# Patient Record
Sex: FEMALE | Race: WHITE | NOT HISPANIC OR LATINO | URBAN - METROPOLITAN AREA
[De-identification: names, ages, dates, MRNs, and addresses within clinical notes are randomized per-mention and may not be internally consistent; named-entity substitution may affect disease eponyms.]

---

## 2020-07-14 ENCOUNTER — OFFICE VISIT (OUTPATIENT)
Dept: URGENT CARE | Facility: CLINIC | Age: 45
End: 2020-07-14
Payer: COMMERCIAL

## 2020-07-14 VITALS
DIASTOLIC BLOOD PRESSURE: 98 MMHG | TEMPERATURE: 98 F | OXYGEN SATURATION: 97 % | HEART RATE: 73 BPM | SYSTOLIC BLOOD PRESSURE: 128 MMHG | RESPIRATION RATE: 18 BRPM

## 2020-07-14 DIAGNOSIS — I10 ESSENTIAL HYPERTENSION: Primary | ICD-10-CM

## 2020-07-14 DIAGNOSIS — Z91.09 ENVIRONMENTAL ALLERGIES: ICD-10-CM

## 2020-07-14 PROCEDURE — 99203 OFFICE O/P NEW LOW 30 MIN: CPT | Performed by: FAMILY MEDICINE

## 2020-07-14 RX ORDER — ALBUTEROL SULFATE 90 UG/1
2 INHALANT RESPIRATORY (INHALATION) EVERY 6 HOURS PRN
Qty: 1 INHALER | Refills: 0 | Status: SHIPPED | OUTPATIENT
Start: 2020-07-14

## 2020-07-14 RX ORDER — LISINOPRIL 40 MG/1
40 TABLET ORAL
COMMUNITY
Start: 2020-07-10 | End: 2020-07-14 | Stop reason: SDUPTHER

## 2020-07-14 RX ORDER — IBUPROFEN 200 MG
1 TABLET ORAL
COMMUNITY
Start: 2020-01-02

## 2020-07-14 RX ORDER — ALBUTEROL SULFATE 90 UG/1
2 INHALANT RESPIRATORY (INHALATION) EVERY 6 HOURS PRN
COMMUNITY
End: 2020-07-14 | Stop reason: SDUPTHER

## 2020-07-14 RX ORDER — METFORMIN HYDROCHLORIDE 1000 MG/1
1000 TABLET ORAL
COMMUNITY
Start: 2020-06-11

## 2020-07-14 RX ORDER — AMLODIPINE BESYLATE 10 MG/1
TABLET ORAL
COMMUNITY
Start: 2020-06-11

## 2020-07-14 RX ORDER — LISINOPRIL 40 MG/1
40 TABLET ORAL DAILY
Qty: 30 TABLET | Refills: 0 | Status: SHIPPED | OUTPATIENT
Start: 2020-07-14 | End: 2020-08-13

## 2020-07-14 RX ORDER — TRIAMTERENE AND HYDROCHLOROTHIAZIDE 37.5; 25 MG/1; MG/1
1 CAPSULE ORAL
COMMUNITY
Start: 2020-03-24

## 2020-07-14 RX ORDER — PIOGLITAZONEHYDROCHLORIDE 30 MG/1
TABLET ORAL
COMMUNITY
Start: 2020-06-11

## 2020-07-14 RX ORDER — METOPROLOL SUCCINATE 100 MG/1
TABLET, EXTENDED RELEASE ORAL
COMMUNITY
Start: 2020-06-11

## 2020-07-14 ASSESSMENT — ENCOUNTER SYMPTOMS
FEVER: 0
SHORTNESS OF BREATH: 0
CHILLS: 0
COUGH: 1

## 2020-07-14 ASSESSMENT — PAIN SCALES - GENERAL: PAINLEVEL: 0-NO PAIN

## 2020-07-14 NOTE — PROGRESS NOTES
Chief Complaint: left albuterol inhaler at home. Needs refill of lisinopril.    Subjective:    Colleen Yung is a 44 y.o. female with:     1) HTN: Patient reports she has been compliant with lisinopril medication for treatment of her hypertension.  She has been on this medication for approximately 1 year with no negative side effects.  She is ranging from [place], but is currently in the [facility] until mid August.  Patient needs a refill for lisinopril she will be out of medications prior to her trip home.    2) seasonal allergies: Patient reports that she typically uses albuterol to help with nighttime coughing due to seasonal allergies.  It typically happens when patient's camping which she is currently doing at this time.  She is currently using other conservative therapies such as Allegra to help with her allergy symptoms.  However, albuterol is what she uses and helps her the most at night.  She does notice some wheezing but denies any issues with shortness of breath.    The following have been reviewed and updated as needed:  Allergies  Meds  Problems  Med Hx  Surg Hx  Fam Hx         Meds:    Current Outpatient Medications:   •  metoprolol succinate XL (TOPROL-XL) 100 mg 24 hr tablet, TAKE 1 TABLET BY MOUTH EVERY DAY, Disp: , Rfl:   •  metFORMIN (GLUCOPHAGE) 1,000 mg tablet, Take 1,000 mg by mouth, Disp: , Rfl:   •  amLODIPine (NORVASC) 10 mg tablet, TAKE 1 TABLET BY MOUTH EVERY DAY, Disp: , Rfl:   •  triamterene-hydrochlorothiazide (DYAZIDE) 37.5-25 mg per capsule, Take 1 capsule by mouth, Disp: , Rfl:   •  pioglitazone (ACTOS) 30 mg tablet, TAKE 1 TABLET BY MOUTH DAILY IN THE MORNING, Disp: , Rfl:   •  nicotine (NICODERM CQ) 21 mg/24 hr, Place 1 patch on the skin, Disp: , Rfl:   •  lisinopriL (PRINIVIL,ZESTRIL) 40 mg tablet, Take 1 tablet (40 mg total) by mouth daily, Disp: 30 tablet, Rfl: 0  •  albuterol HFA (Proventil HFA) 90 mcg/actuation inhaler, Inhale 2 puffs every 6 (six) hours as needed  for wheezing, Disp: 1 Inhaler, Rfl: 0    Allergies:  Allergies   Allergen Reactions   • Glipizide Anxiety     tingling       Review of Systems:  Review of Systems   Constitutional: Negative for chills and fever.   Respiratory: Positive for cough. Negative for shortness of breath.    All other systems reviewed and are negative.      Objective:  /98 (BP Location: Left arm, Patient Position: Sitting, Cuff Size: Large Adult)   Pulse 73   Temp 36.7 °C (98 °F) (Oral)   Resp 18   SpO2 97%     Physical Exam  Vitals signs and nursing note reviewed.   Constitutional:       General: She is not in acute distress.     Appearance: She is obese. She is not ill-appearing.   Eyes:      Conjunctiva/sclera: Conjunctivae normal.   Cardiovascular:      Rate and Rhythm: Normal rate and regular rhythm.      Pulses:           Radial pulses are 2+ on the right side and 2+ on the left side.      Heart sounds: Normal heart sounds.   Pulmonary:      Effort: Pulmonary effort is normal.      Breath sounds: Normal breath sounds.   Skin:     General: Skin is warm.      Capillary Refill: Capillary refill takes 2 to 3 seconds.   Neurological:      Mental Status: She is alert and oriented to person, place, and time.       Assessment/Plan:    Colleen was seen today for med refill and med refill.    Diagnoses and all orders for this visit:    Essential hypertension: Borderline but mildly elevated blood pressure could be secondary to patient's stress from her daughter had to have a tick removed some complication due to her anxiety.  Continue with current regimen of lisinopril 40 mg daily.  -     lisinopriL (PRINIVIL,ZESTRIL) 40 mg tablet; Take 1 tablet (40 mg total) by mouth daily    Environmental allergies: Given patient's symptoms of wheezing and cough, she will likely benefit from continue to use her albuterol inhaler.  Instructed patient to continue using her Allegra medication to help with her allergy symptoms.  -     albuterol HFA  (Proventil HFA) 90 mcg/actuation inhaler; Inhale 2 puffs every 6 (six) hours as needed for wheezing    Follow Up: PRN    The patient is in agreement with this plan and has no further questions at this time.     A voice recognition program was used to aid in documentation of this record.  Sometimes words are not printed exactly as they were spoken.  While efforts were made to carefully edit and correct any inaccuracies, some areas may be present; please take these into context.  Please contact the provider if areas are identified.    Valentina Hendricks MD

## 2021-03-17 ENCOUNTER — WALK IN (OUTPATIENT)
Dept: URGENT CARE | Age: 46
End: 2021-03-17

## 2021-03-17 VITALS
DIASTOLIC BLOOD PRESSURE: 85 MMHG | SYSTOLIC BLOOD PRESSURE: 140 MMHG | RESPIRATION RATE: 18 BRPM | HEART RATE: 103 BPM | OXYGEN SATURATION: 97 % | TEMPERATURE: 97.7 F

## 2021-03-17 DIAGNOSIS — J06.9 VIRAL URI WITH COUGH: Primary | ICD-10-CM

## 2021-03-17 LAB
INTERNAL PROCEDURAL CONTROLS ACCEPTABLE: YES
S PYO AG THROAT QL IA.RAPID: NEGATIVE

## 2021-03-17 PROCEDURE — 87880 STREP A ASSAY W/OPTIC: CPT | Performed by: PHYSICIAN ASSISTANT

## 2021-03-17 PROCEDURE — 99203 OFFICE O/P NEW LOW 30 MIN: CPT | Performed by: PHYSICIAN ASSISTANT

## 2021-03-17 RX ORDER — AMLODIPINE BESYLATE 10 MG/1
10 TABLET ORAL DAILY
COMMUNITY
Start: 2021-03-10

## 2021-03-17 RX ORDER — PIOGLITAZONEHYDROCHLORIDE 30 MG/1
TABLET ORAL
COMMUNITY
Start: 2021-03-10

## 2021-03-17 RX ORDER — METOPROLOL SUCCINATE 100 MG/1
100 TABLET, EXTENDED RELEASE ORAL DAILY
COMMUNITY
Start: 2021-03-10

## 2021-03-17 RX ORDER — FLUTICASONE PROPIONATE 110 UG/1
2 AEROSOL, METERED RESPIRATORY (INHALATION) 2 TIMES DAILY
Qty: 12 G | Refills: 0 | Status: SHIPPED | OUTPATIENT
Start: 2021-03-17

## 2021-03-17 RX ORDER — ALBUTEROL SULFATE 90 UG/1
AEROSOL, METERED RESPIRATORY (INHALATION)
COMMUNITY
Start: 2021-03-04

## 2021-03-17 RX ORDER — LISINOPRIL 40 MG/1
40 TABLET ORAL DAILY
COMMUNITY
Start: 2021-03-10

## 2021-03-17 ASSESSMENT — ENCOUNTER SYMPTOMS
HEADACHES: 0
TROUBLE SWALLOWING: 0
COUGH: 1
RHINORRHEA: 0
DIZZINESS: 0
DIAPHORESIS: 0
NAUSEA: 0
SINUS PAIN: 0
DIARRHEA: 0
CHILLS: 0
VOMITING: 0
FATIGUE: 1
WHEEZING: 0
FEVER: 0
SHORTNESS OF BREATH: 0
ACTIVITY CHANGE: 1
SORE THROAT: 1